# Patient Record
Sex: MALE | URBAN - METROPOLITAN AREA
[De-identification: names, ages, dates, MRNs, and addresses within clinical notes are randomized per-mention and may not be internally consistent; named-entity substitution may affect disease eponyms.]

---

## 2022-12-05 ENCOUNTER — TELEPHONE (OUTPATIENT)
Dept: SURGERY | Age: 67
End: 2022-12-05

## 2023-03-01 ENCOUNTER — NURSE ONLY (OUTPATIENT)
Age: 68
End: 2023-03-01

## 2023-03-01 VITALS
RESPIRATION RATE: 17 BRPM | OXYGEN SATURATION: 100 % | WEIGHT: 215 LBS | TEMPERATURE: 97.5 F | HEART RATE: 59 BPM | BODY MASS INDEX: 31.84 KG/M2 | HEIGHT: 69 IN

## 2023-03-01 DIAGNOSIS — Z12.11 COLON CANCER SCREENING: ICD-10-CM

## 2023-03-01 DIAGNOSIS — Z01.818 PRE-OP TESTING: Primary | ICD-10-CM

## 2023-03-01 DIAGNOSIS — E11.9 CONTROLLED TYPE 2 DIABETES MELLITUS WITHOUT COMPLICATION, WITHOUT LONG-TERM CURRENT USE OF INSULIN (HCC): ICD-10-CM

## 2023-03-01 RX ORDER — ASPIRIN 81 MG/1
81 TABLET ORAL DAILY
COMMUNITY

## 2023-03-01 RX ORDER — ATORVASTATIN CALCIUM 40 MG/1
TABLET, FILM COATED ORAL
COMMUNITY
Start: 2019-07-29

## 2023-03-01 NOTE — PROGRESS NOTES
Colon Screen    Patient: Liang Ennis MRN: [id number]  SSN: [id number]    YOB: 1955  Age: 79 y.o. Sex: male        Subjective:   Liang Ennis was referred by his PCP, Ismael Merlin, MD.  Patient referred for colonoscopy for   Screening colonoscopy. Patient denies rectal pain or bleeding. Abdominal surgeries as described below, specifically none. Family history as described below, specifically none. Patient has never had a colonoscopy. No Known Allergies    Past Medical History:   Diagnosis Date    Diabetes mellitus ([facility] [address]     History of COVID-19     History of pneumonia      No past surgical history on file. No family history on file. Social History     Tobacco Use    Smoking status: Former     Types: Cigarettes    Smokeless tobacco: Former   Substance Use Topics    Alcohol use: Not Currently      Prior to Admission medications    Medication Sig Start Date End Date Taking?  Authorizing Provider   aspirin 81 MG EC tablet Take 81 mg by mouth daily   Yes Historical Provider, MD   atorvastatin (LIPITOR) 40 MG tablet Lipitor 40 mg tablet   1 po qd 7/29/19  Yes Historical Provider, MD   cyanocobalamin 1000 MCG tablet Take 1,000 mcg by mouth daily 9/23/19  Yes Historical Provider, MD   esomeprazole (NEXIUM) 20 MG delayed release capsule Take 20 mg by mouth daily   Yes Historical Provider, MD   metFORMIN (GLUCOPHAGE) 1000 MG tablet metformin 1,000 mg tablet   TAKE 1 TABLET BY MOUTH TWICE DAILY   Yes Historical Provider, MD            Risks colonoscopy described- colon injury, missed lesion, anesthesia problems, bleeding       Lacretia VERÓNICA Aguirre  March 1, 2707  6:97 AM

## 2024-04-15 RX ORDER — GLIPIZIDE 5 MG/1
5 TABLET ORAL
COMMUNITY

## 2024-04-15 RX ORDER — INSULIN GLARGINE 100 [IU]/ML
10 INJECTION, SOLUTION SUBCUTANEOUS DAILY
COMMUNITY
Start: 2021-01-03

## 2024-04-15 NOTE — PERIOP NOTE
Instructions for your surgery at [facility]      Today's Date:  4/15/2024      Patient's Name:  Silvano Jiang           Surgery Date:  5/24/2024              Please enter the main entrance of the hospital and check-in at the  located in the lobby. Once checked in at the , you will take the elevators to the second floor, and report to the waiting room on the left. The room will say Procedure Registration.    Do NOT eat or drink anything, including candy, gum, or ice chips after midnight prior to your surgery, unless you have specific instructions from your surgeon or anesthesia provider to do so.  Brush your teeth before coming to the hospital. You may swish with water, but do not swallow.  No smoking/Vaping/E-Cigarettes 24 hours prior to the day of surgery.  No alcohol 24 hours prior to the day of surgery.  No recreational drugs for one week prior to the day of surgery.  Bring Photo ID, Insurance information, and Co-pay if required on day of surgery.  Bring in pertinent legal documents, such as, Medical Power of , DNR, Advance Directive, etc.  Leave all valuables, including money/purse, at home.  Remove all jewelry, including ALL body piercings, nail polish, acrylic nails, and makeup (including mascara); no lotions, powders, deodorant, or perfume/cologne/after shave on the skin.  Follow instruction for Hibiclens washes and CHG wipes from surgeon's office.   Glasses and dentures may be worn to the hospital. They must be removed prior to surgery. Please bring case/container for glasses or dentures.   Contact lenses should not be worn on day of surgery.   Call your doctor's office if symptoms of a cold or illness develop within 24-48 hours prior to your surgery.  Call your doctor's office if you have any questions concerning insurance or co-pays.  15. AN ADULT (relative or friend 18 years or older) MUST DRIVE YOU HOME AFTER YOUR SURGERY.  16. Please make

## 2024-04-24 DIAGNOSIS — E11.9 CONTROLLED TYPE 2 DIABETES MELLITUS WITHOUT COMPLICATION, WITHOUT LONG-TERM CURRENT USE OF INSULIN (HCC): ICD-10-CM

## 2024-04-24 DIAGNOSIS — Z12.11 COLON CANCER SCREENING: Primary | ICD-10-CM

## 2024-04-24 DIAGNOSIS — Z01.818 PRE-OP TESTING: ICD-10-CM

## 2024-04-26 ENCOUNTER — TELEPHONE (OUTPATIENT)
Age: 69
End: 2024-04-26

## 2024-04-26 NOTE — TELEPHONE ENCOUNTER
Spoke to patient and confirmed upcoming colonoscopy on 5/24. 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

## 2024-04-27 ENCOUNTER — HOSPITAL ENCOUNTER (OUTPATIENT)
Facility: HOSPITAL | Age: 69
Discharge: HOME | End: 2024-04-30
Payer: MEDICARE

## 2024-04-27 DIAGNOSIS — Z12.11 COLON CANCER SCREENING: ICD-10-CM

## 2024-04-27 DIAGNOSIS — E11.9 CONTROLLED TYPE 2 DIABETES MELLITUS WITHOUT COMPLICATION, WITHOUT LONG-TERM CURRENT USE OF INSULIN (HCC): ICD-10-CM

## 2024-04-27 DIAGNOSIS — Z01.818 PRE-OP TESTING: ICD-10-CM

## 2024-04-27 LAB
ALBUMIN SERPL-MCNC: 3.8 G/DL (ref 3.4–5)
ALBUMIN/GLOB SERPL: 1.1 (ref 0.8–1.7)
ALP SERPL-CCNC: 69 U/L (ref 45–117)
ALT SERPL-CCNC: 27 U/L (ref 16–61)
ANION GAP SERPL CALC-SCNC: 4 MMOL/L (ref 3–18)
AST SERPL-CCNC: 18 U/L (ref 10–38)
BILIRUB SERPL-MCNC: 0.3 MG/DL (ref 0.2–1)
BUN SERPL-MCNC: 12 MG/DL (ref 7–18)
BUN/CREAT SERPL: 13 (ref 12–20)
CALCIUM SERPL-MCNC: 9.5 MG/DL (ref 8.5–10.1)
CHLORIDE SERPL-SCNC: 109 MMOL/L (ref 100–111)
CO2 SERPL-SCNC: 27 MMOL/L (ref 21–32)
CREAT SERPL-MCNC: 0.9 MG/DL (ref 0.6–1.3)
GLOBULIN SER CALC-MCNC: 3.4 G/DL (ref 2–4)
GLUCOSE SERPL-MCNC: 218 MG/DL (ref 74–99)
POTASSIUM SERPL-SCNC: 4.5 MMOL/L (ref 3.5–5.5)
PROT SERPL-MCNC: 7.2 G/DL (ref 6.4–8.2)
SODIUM SERPL-SCNC: 140 MMOL/L (ref 136–145)

## 2024-04-27 PROCEDURE — 93005 ELECTROCARDIOGRAM TRACING: CPT

## 2024-04-27 PROCEDURE — 36415 COLL VENOUS BLD VENIPUNCTURE: CPT

## 2024-04-27 PROCEDURE — 80053 COMPREHEN METABOLIC PANEL: CPT

## 2024-04-30 LAB
EKG ATRIAL RATE: 54 BPM
EKG DIAGNOSIS: NORMAL
EKG P AXIS: 62 DEGREES
EKG P-R INTERVAL: 216 MS
EKG Q-T INTERVAL: 444 MS
EKG QRS DURATION: 88 MS
EKG QTC CALCULATION (BAZETT): 421 MS
EKG R AXIS: -28 DEGREES
EKG T AXIS: 62 DEGREES
EKG VENTRICULAR RATE: 54 BPM

## 2024-04-30 PROCEDURE — 93010 ELECTROCARDIOGRAM REPORT: CPT | Performed by: INTERNAL MEDICINE

## 2024-05-22 ENCOUNTER — ANESTHESIA EVENT (OUTPATIENT)
Facility: HOSPITAL | Age: 69
End: 2024-05-22
Payer: MEDICARE

## 2024-05-24 ENCOUNTER — HOSPITAL ENCOUNTER (OUTPATIENT)
Facility: HOSPITAL | Age: 69
Setting detail: OUTPATIENT SURGERY
Discharge: HOME | End: 2024-05-24
Attending: COLON & RECTAL SURGERY | Admitting: COLON & RECTAL SURGERY
Payer: MEDICARE

## 2024-05-24 ENCOUNTER — ANESTHESIA (OUTPATIENT)
Facility: HOSPITAL | Age: 69
End: 2024-05-24
Payer: MEDICARE

## 2024-05-24 VITALS
RESPIRATION RATE: 15 BRPM | OXYGEN SATURATION: 99 % | WEIGHT: 215 LBS | SYSTOLIC BLOOD PRESSURE: 166 MMHG | TEMPERATURE: 98.4 F | HEIGHT: 72 IN | BODY MASS INDEX: 29.12 KG/M2 | DIASTOLIC BLOOD PRESSURE: 90 MMHG | HEART RATE: 63 BPM

## 2024-05-24 LAB
AMPHET UR QL SCN: NEGATIVE
BARBITURATES UR QL SCN: NEGATIVE
BENZODIAZ UR QL: NEGATIVE
CANNABINOIDS UR QL SCN: NEGATIVE
COCAINE UR QL SCN: NEGATIVE
GLUCOSE BLD STRIP.AUTO-MCNC: 153 MG/DL (ref 70–110)
Lab: NORMAL
METHADONE UR QL: NEGATIVE
OPIATES UR QL: NEGATIVE
PCP UR QL: NEGATIVE

## 2024-05-24 PROCEDURE — 7100000011 HC PHASE II RECOVERY - ADDTL 15 MIN: Performed by: COLON & RECTAL SURGERY

## 2024-05-24 PROCEDURE — 2709999900 HC NON-CHARGEABLE SUPPLY: Performed by: COLON & RECTAL SURGERY

## 2024-05-24 PROCEDURE — 80307 DRUG TEST PRSMV CHEM ANLYZR: CPT

## 2024-05-24 PROCEDURE — 82962 GLUCOSE BLOOD TEST: CPT

## 2024-05-24 PROCEDURE — 2580000003 HC RX 258: Performed by: NURSE ANESTHETIST, CERTIFIED REGISTERED

## 2024-05-24 PROCEDURE — 7100000001 HC PACU RECOVERY - ADDTL 15 MIN: Performed by: COLON & RECTAL SURGERY

## 2024-05-24 PROCEDURE — G0121 COLON CA SCRN NOT HI RSK IND: HCPCS | Performed by: COLON & RECTAL SURGERY

## 2024-05-24 PROCEDURE — 7100000010 HC PHASE II RECOVERY - FIRST 15 MIN: Performed by: COLON & RECTAL SURGERY

## 2024-05-24 PROCEDURE — 3700000000 HC ANESTHESIA ATTENDED CARE: Performed by: COLON & RECTAL SURGERY

## 2024-05-24 PROCEDURE — 7100000000 HC PACU RECOVERY - FIRST 15 MIN: Performed by: COLON & RECTAL SURGERY

## 2024-05-24 PROCEDURE — 3600007502: Performed by: COLON & RECTAL SURGERY

## 2024-05-24 PROCEDURE — 3700000001 HC ADD 15 MINUTES (ANESTHESIA): Performed by: COLON & RECTAL SURGERY

## 2024-05-24 PROCEDURE — 3600007512: Performed by: COLON & RECTAL SURGERY

## 2024-05-24 RX ORDER — SODIUM CHLORIDE 0.9 % (FLUSH) 0.9 %
5-40 SYRINGE (ML) INJECTION PRN
Status: DISCONTINUED | OUTPATIENT
Start: 2024-05-24 | End: 2024-05-24 | Stop reason: HOSPADM

## 2024-05-24 RX ORDER — FAMOTIDINE 20 MG/1
20 TABLET, FILM COATED ORAL ONCE
Status: DISCONTINUED | OUTPATIENT
Start: 2024-05-24 | End: 2024-05-24 | Stop reason: HOSPADM

## 2024-05-24 RX ORDER — DEXTROSE MONOHYDRATE 100 MG/ML
INJECTION, SOLUTION INTRAVENOUS CONTINUOUS PRN
Status: DISCONTINUED | OUTPATIENT
Start: 2024-05-24 | End: 2024-05-24 | Stop reason: HOSPADM

## 2024-05-24 RX ORDER — LIDOCAINE HYDROCHLORIDE 10 MG/ML
1 INJECTION, SOLUTION EPIDURAL; INFILTRATION; INTRACAUDAL; PERINEURAL
Status: DISCONTINUED | OUTPATIENT
Start: 2024-05-24 | End: 2024-05-24 | Stop reason: HOSPADM

## 2024-05-24 RX ORDER — SODIUM CHLORIDE, SODIUM LACTATE, POTASSIUM CHLORIDE, CALCIUM CHLORIDE 600; 310; 30; 20 MG/100ML; MG/100ML; MG/100ML; MG/100ML
INJECTION, SOLUTION INTRAVENOUS CONTINUOUS
Status: DISCONTINUED | OUTPATIENT
Start: 2024-05-24 | End: 2024-05-24 | Stop reason: HOSPADM

## 2024-05-24 RX ORDER — INSULIN LISPRO 100 [IU]/ML
0-15 INJECTION, SOLUTION INTRAVENOUS; SUBCUTANEOUS ONCE
Status: DISCONTINUED | OUTPATIENT
Start: 2024-05-24 | End: 2024-05-24 | Stop reason: HOSPADM

## 2024-05-24 RX ADMIN — SODIUM CHLORIDE, POTASSIUM CHLORIDE, SODIUM LACTATE AND CALCIUM CHLORIDE: 600; 310; 30; 20 INJECTION, SOLUTION INTRAVENOUS at 12:32

## 2024-05-24 ASSESSMENT — PAIN - FUNCTIONAL ASSESSMENT
PAIN_FUNCTIONAL_ASSESSMENT: 0-10
PAIN_FUNCTIONAL_ASSESSMENT: 0-10

## 2024-05-24 NOTE — DISCHARGE INSTRUCTIONS
Colonoscopy: What to Expect at Home  Your Recovery  After a colonoscopy, you'll stay at the clinic until you wake up. Then you can go home. But you'll need to arrange for a ride. Your doctor will tell you when you can eat and do your other usual activities.  Your doctor will talk to you about when you'll need your next colonoscopy. Your doctor can help you decide how often you need to be checked. This will depend on the results of your test and your risk for colorectal cancer.  After the test, you may be bloated or have gas pains. You may need to pass gas. If a biopsy was done or a polyp was removed, you may have streaks of blood in your stool (feces) for a few days. Problems such as heavy rectal bleeding may not occur until several weeks after the test. This isn't common. But it can happen after polyps are removed.  This care sheet gives you a general idea about how long it will take for you to recover. But each person recovers at a different pace. Follow the steps below to get better as quickly as possible.  How can you care for yourself at home?  Activity    Rest when you feel tired.     You can do your normal activities when it feels okay to do so.   Diet    Follow your doctor's directions for eating.     Unless your doctor has told you not to, drink plenty of fluids. This helps to replace the fluids that were lost during the colon prep.     Do not drink alcohol.   Medicines    Your doctor will tell you if and when you can restart your medicines. You will also be given instructions about taking any new medicines.     If you take aspirin or some other blood thinner, ask your doctor if and when to start taking it again. Make sure that you understand exactly what your doctor wants you to do.     If polyps were removed or a biopsy was done during the test, your doctor may tell you not to take aspirin or other anti-inflammatory medicines for a few days. These include ibuprofen (Advil, Motrin) and naproxen (Aleve).  Nurse     POST-PROCEDURE INSTRUCTIONS:    Call your Physician if you:  Observe any excess bleeding.  Develop a temperature over 100.5o F.  Experience abdominal, shoulder or chest pain.  Notice any signs of decreased circulation or nerve impairment to an extremity such as a change in color, persistent numbness, tingling, coldness or increase in pain.  Vomit blood or you have nausea and vomiting lasting longer than 4 hours.  Are unable to take medications.  Are unable to urinate within 8 hours after discharge following general anesthesia or intravenous sedation.    For the next 24 hours after receiving general anesthesia or intravenous sedation, or while taking prescription Narcotics, limit your activities:  Do NOT drive a motor vehicle, operate hazard machinery or power tools, or perform tasks that require coordination.  The medication you received during your procedure may have some effect on your mental awareness.  Do NOT make important personal or business decisions.  The medication you received during your procedure may have some effect on your mental awareness.  Do NOT drink alcoholic beverages.  These drinks do not mix well with the medications that have been given to you.    Upon discharge from the hospital, you must be accompanied by a responsible adult.    Resume your diet as directed by your physician.    Resume medications as your physician has prescribed.  Please give a list of your current medications to your Primary Care Provider.  Please update this list whenever your medications are discontinued, doses are changed, or new medications (including over-the-counter products) are added.  Please carry medication information at all times in case of emergency situations.          These are general instructions for a healthy lifestyle:    No smoking/ No tobacco products/ Avoid exposure to second hand smoke.  Surgeon General's Warning:  Quitting smoking now greatly reduces serious risk to your

## 2024-05-24 NOTE — ANESTHESIA PRE PROCEDURE
IntraVENous PRN Raphael Orozco APRN - CRNA        Or   • dextrose bolus 10% 250 mL  250 mL IntraVENous PRN Raphael Orozco APRN - CRNA       • glucagon injection 1 mg  1 mg SubCUTAneous PRN Raphael Orozco APRN - CRNA       • dextrose 10 % infusion   IntraVENous Continuous PRN Raphael Orozco APRN - CRNA           Allergies:  No Known Allergies    Problem List:  There is no problem list on file for this patient.      Past Medical History:        Diagnosis Date   • Diabetes mellitus (HCC)     type 2   • History of COVID-19 2020   • History of pneumonia     7 yrs per patient       Past Surgical History:  History reviewed. No pertinent surgical history.    Social History:    Social History     Tobacco Use   • Smoking status: Former     Types: Cigarettes   • Smokeless tobacco: Former   Substance Use Topics   • Alcohol use: Not Currently                                Counseling given: Not Answered      Vital Signs (Current):   Vitals:    04/15/24 1017   Weight: 97.5 kg (215 lb)   Height: 1.829 m (6')                                              BP Readings from Last 3 Encounters:   No data found for BP       NPO Status: Time of last liquid consumption: 0915                        Time of last solid consumption: 2200                        Date of last liquid consumption: 05/24/24                        Date of last solid food consumption: 05/22/24    BMI:   Wt Readings from Last 3 Encounters:   04/15/24 97.5 kg (215 lb)   03/01/23 97.5 kg (215 lb)     Body mass index is 29.16 kg/m².    CBC: No results found for: \"WBC\", \"RBC\", \"HGB\", \"HCT\", \"MCV\", \"RDW\", \"PLT\"    CMP:   Lab Results   Component Value Date/Time     04/27/2024 09:13 AM    K 4.5 04/27/2024 09:13 AM     04/27/2024 09:13 AM    CO2 27 04/27/2024 09:13 AM    BUN 12 04/27/2024 09:13 AM    CREATININE 0.90 04/27/2024 09:13 AM    LABGLOM >90 04/27/2024 09:13 AM    GLUCOSE 218 04/27/2024 09:13 AM    CALCIUM 9.5 04/27/2024 09:13 AM    BILITOT 0.3

## 2024-05-24 NOTE — OP NOTE
Colonoscopy Procedure Note      Silvano Jiang  1955  [id number]                Date of Procedure: 05/24/24    Preoperative diagnosis: Screen    Postoperative diagnosis: Normal    :  Carlos Leigh MD    Assistant(s): Circulator: Duane Barriga RN  Endoscopy Technician: Carola Onofre    Sedation: MAC    Complications: None    Implants: None    Procedure Details:  Prior to the procedure, a history and physical were performed. The patient’s medications, allergies and sensitivities were reviewed and all questions were answered.  After informed consent was obtained for the procedure, with all risks and benefits of procedure explained. The patient was taken to the endoscopy suite and placed in the left lateral decubitus position.  Patient identification and proposed procedure were verified prior to the procedure by the nurse and I. After sequential anesthesia administered by anesthesiologist, a digital rectal exam was performed and was normal.  The Olympus video colonoscope was introduced through the anus and advanced to cecum, which was identified by the ileocecal valve and appendiceal orifice.   The colonoscope was slowly withdrawn and the mucosa examined for any abnormalities.  Cecal withdrawal time was greater than 6 minutes. The patient tolerated the procedure well. There were no complications.    The quality of preparation was good.     Findings/Interventions:   Polyps - none    EBL: none    Recommendations: -Repeat colonoscopy in 10 years   Resume normal medication(s).     Discharge Disposition:  Home  Carlos Leigh MD  5/24/2024  1:14 PM

## 2024-05-24 NOTE — ANESTHESIA POSTPROCEDURE EVALUATION
Department of Anesthesiology  Postprocedure Note    Patient: Silvano Jiang  MRN: [id number]  YOB: 1955  Date of evaluation: 5/24/2024    Procedure Summary       Date: 05/24/24 Room / Location: [facility] ENDO 03 / [facility] ENDOSCOPY    Anesthesia Start: 1253 Anesthesia Stop: 1320    Procedure: COLONOSCOPY (Abdomen) Diagnosis:       Colon cancer screening      (Colon cancer screening [Z12.11])    Surgeons: Carlos Leigh MD Responsible Provider: Allyn Rodriguez MD    Anesthesia Type: MAC ASA Status: 3            Anesthesia Type: MAC    Belen Phase I: Belen Score: 10    Belen Phase II: Belen Score: 10    Anesthesia Post Evaluation    Patient location during evaluation: bedside  Patient participation: complete - patient participated  Airway patency: patent  Cardiovascular status: hemodynamically stable  Respiratory status: acceptable  Hydration status: stable    No notable events documented.

## 2024-05-24 NOTE — H&P
HPI: Silvano Jiang is a 68 y.o. male presenting with chief complain of need for crc screening.    Past Medical History:   Diagnosis Date    Diabetes mellitus (HCC)     type 2    History of COVID-19     2020    History of pneumonia     7 yrs per patient       History reviewed. No pertinent surgical history.    History reviewed. No pertinent family history.    Social History     Socioeconomic History    Marital status: Unknown     Spouse name: None    Number of children: None    Years of education: None    Highest education level: None   Tobacco Use    Smoking status: Former     Types: Cigarettes    Smokeless tobacco: Former   Vaping Use    Vaping Use: Never used   Substance and Sexual Activity    Alcohol use: Not Currently    Drug use: Not Currently     Types: Cocaine     Comment: none in over 15 years       Current Outpatient Medications   Medication Instructions    aspirin 81 mg, Oral, DAILY    atorvastatin (LIPITOR) 40 MG tablet Lipitor 40 mg tablet   1 po qd    cyanocobalamin 1,000 mcg, Oral, DAILY    esomeprazole (NEXIUM) 20 mg, Oral, DAILY    glipiZIDE (GLUCOTROL) 5 mg, Oral, 2 TIMES DAILY BEFORE MEALS    insulin glargine (LANTUS) 10 Units, SubCUTAneous, DAILY    metFORMIN (GLUCOPHAGE) 1000 MG tablet Take 1 tablet by mouth daily (with breakfast)        No Known Allergies    ROS: negative    There were no vitals filed for this visit.    Physical Exam  Constitutional:       Appearance: He is well-developed.   HENT:      Head: Normocephalic and atraumatic.   Abdominal:      General: There is no distension.      Palpations: Abdomen is soft.      Tenderness: There is no abdominal tenderness.   Skin:     General: Skin is warm and dry    Assessment / Plan    colonoscopy    The diagnoses and plan were discussed with the patient. All questions answered.  Plan of care agreed to by all concerned.

## (undated) DEVICE — GOWN PLASTIC FILM THMBHKS UNIV BLUE: Brand: CARDINAL HEALTH

## (undated) DEVICE — CANNULA NSL AD TBNG L14FT STD PVC O2 CRV CONN NONFLARED NSL

## (undated) DEVICE — ENDOSCOPY PUMP TUBING/ CAP SET: Brand: ERBE

## (undated) DEVICE — SYRINGE 20ML LL S/C 50

## (undated) DEVICE — GAUZE,SPONGE,4"X4",16PLY,STRL,LF,10/TRAY: Brand: MEDLINE

## (undated) DEVICE — SOLUTION IRRIG 1000ML H2O STRL BLT

## (undated) DEVICE — FORCEPS BX L240CM JAW DIA2.8MM L CAP W/ NDL MIC MESH TOOTH

## (undated) DEVICE — MEDI-VAC NON-CONDUCTIVE SUCTION TUBING: Brand: CARDINAL HEALTH

## (undated) DEVICE — SNARE POLYP M W27MMXL240CM OVL STIFF DISP CAPTIVATOR

## (undated) DEVICE — LINER SUCT CANSTR 3000CC PLAS SFT PRE ASSEMB W/OUT TBNG W/

## (undated) DEVICE — CATHETER SUCT TR FL TIP 14FR W/ O CTRL

## (undated) DEVICE — CATHETER THOR 36FR DIA10.7MM POLYVI CHL TRCR TIP STR SFT

## (undated) DEVICE — SYRINGE MED 25GA 3ML L5/8IN SUBQ PLAS W/ DETACH NDL SFTY

## (undated) DEVICE — SYRINGE MED 50ML LUERSLIP TIP

## (undated) DEVICE — YANKAUER,SMOOTH HANDLE,HIGH CAPACITY: Brand: MEDLINE INDUSTRIES, INC.

## (undated) DEVICE — SYRINGE MED 10ML LUERLOCK TIP W/O SFTY DISP

## (undated) DEVICE — UNDERPAD INCONT W23XL36IN STD BLU POLYPR BK FLUF SFT

## (undated) DEVICE — CANNULA ORIG TL CLR W FOAM CUSHIONS AND 14FT SUPL TB 3 CHN